# Patient Record
Sex: FEMALE | URBAN - METROPOLITAN AREA
[De-identification: names, ages, dates, MRNs, and addresses within clinical notes are randomized per-mention and may not be internally consistent; named-entity substitution may affect disease eponyms.]

---

## 2018-09-03 ENCOUNTER — EMERGENCY (EMERGENCY)
Facility: HOSPITAL | Age: 43
LOS: 1 days | Discharge: HOME | End: 2018-09-03
Attending: EMERGENCY MEDICINE | Admitting: EMERGENCY MEDICINE
Payer: SELF-PAY

## 2018-09-03 VITALS
OXYGEN SATURATION: 100 % | DIASTOLIC BLOOD PRESSURE: 99 MMHG | HEART RATE: 76 BPM | TEMPERATURE: 98 F | SYSTOLIC BLOOD PRESSURE: 138 MMHG | RESPIRATION RATE: 16 BRPM

## 2018-09-03 PROCEDURE — 99284 EMERGENCY DEPT VISIT MOD MDM: CPT

## 2018-09-03 RX ORDER — ACETAMINOPHEN 500 MG
975 TABLET ORAL ONCE
Qty: 0 | Refills: 0 | Status: COMPLETED | OUTPATIENT
Start: 2018-09-03 | End: 2018-09-03

## 2018-09-03 RX ORDER — IBUPROFEN 200 MG
600 TABLET ORAL ONCE
Qty: 0 | Refills: 0 | Status: COMPLETED | OUTPATIENT
Start: 2018-09-03 | End: 2018-09-03

## 2018-09-03 RX ADMIN — Medication 600 MILLIGRAM(S): at 23:06

## 2018-09-03 RX ADMIN — Medication 975 MILLIGRAM(S): at 23:06

## 2018-09-03 NOTE — ED PROVIDER NOTE - PHYSICAL EXAMINATION
Head: There is a mild erythema surrounding the L eye no bruising or bleeding, EOMI  Cervical spine without TTP, TTP on the lumbar spine  TTP of the R forearm, R knee, L hip, no overlying signs of trauma or deformity

## 2018-09-03 NOTE — ED ADULT NURSE NOTE - OBJECTIVE STATEMENT
rec'd pt. a&ox3, with hx of ovarian cyst, came in s/p fall tonight. as per pt, she was going down the stairs, when she slipped about 6 steps. pt. states she hit the left side of her head, then hit her back on the last step. pt. denies any LOC, felt dizzy after fall. denies any n/v.  pt. currently c/o pain on left parietal area, rt f/a, mid-lower back and rt knee worsened upon mov't. noted swelling on left side of forehead, abrasion on left side of face. also noted swelling on rt ankle. pt. denies any numbness, no urinary/bowel incontinence s/p fall. pt. denies any chest pain nor dizziness prior fall. pt. appears very uncomfortable. awaits MD nolen. will continue to monitor

## 2018-09-03 NOTE — ED PROVIDER NOTE - ATTENDING CONTRIBUTION TO CARE
Gong: I have seen and examined the patient face to face, have reviewed and addended the HPI, PE and a/p as necessary.    44 yo F with no pmh arrives after fall down 6 stairs, noting to have hit head and right knee and left hip while fallling down the stairs.  Patient report 10/10 pain in lower back and right knee.  Notable to have severe pain over forehead and around eyes with slight redness noted on face.    Gen: In severe pain in multiple locations, notable for exquisite tenderness around eyes to just light touch, without significant swelling or ecchymosis, no stepoff or deformity noted around orbit, or on the skull; chest no palpable pain, lungs clear to auscultation, chest s1 s2 rrr no murmur gallops rubs, Abd: soft ntnd no rebound no guarding; back: tender in lower back lateral to spine bilaterally; Tender at left hip; no right hip tenderness; and R knee tenderness to light touch, no obvious swelling or deformity, unable to palpate full knee 2/2 pain.  Tenderness to R arm.      44 yo F a/w fall down 6 steps, concern for possible fractures and msk injury.    - ct scan of abd pelvis with spine reconstruct of LS  - xray of knee  - xray r arm  - ct head max fac  - pain control

## 2018-09-03 NOTE — ED PROVIDER NOTE - PROGRESS NOTE DETAILS
Spoke with patient told her that xrays and CTs were negative she feels comfortable going home and using over the counter pain medications. She is going to take a cab.

## 2018-09-03 NOTE — ED PROVIDER NOTE - OBJECTIVE STATEMENT
Pt is a 43 Y F with no pmhx who says she fell while walking down the stairs today at work, she states she slipped off the last step, hitting her low back against the step and the right side of her face against the wall. She says that she didn't pass out, she denies nausea, vomiting. She does not take any medications including any blood thinners. She says she can't walk on her L leg because of pain in the knee. She says her face and head hurt. She denies any neck pain but she says her low back is very painful. She denies abdominal pain.

## 2018-09-03 NOTE — ED ADULT TRIAGE NOTE - CHIEF COMPLAINT QUOTE
pt comes to ED by EMS for mechanical fall pt tripped down 6 steps pt hit back on her last step. pt is complaining of back and leg pain. pt denies any CP or dizziness prior to falling. pt denies LOC or hitting her head. pt denies being on any blood thinners or PMHX. pt VSS

## 2018-09-03 NOTE — ED ADULT NURSE NOTE - NSIMPLEMENTINTERV_GEN_ALL_ED
Implemented All Fall Risk Interventions:  [place] to call system. Call bell, personal items and telephone within reach. Instruct patient to call for assistance. Room bathroom lighting operational. Non-slip footwear when patient is off stretcher. Physically safe environment: no spills, clutter or unnecessary equipment. Stretcher in lowest position, wheels locked, appropriate side rails in place. Provide visual cue, wrist band, yellow gown, etc. Monitor gait and stability. Monitor for mental status changes and reorient to person, place, and time. Review medications for side effects contributing to fall risk. Reinforce activity limits and safety measures with patient and family.

## 2018-09-04 VITALS
HEART RATE: 74 BPM | DIASTOLIC BLOOD PRESSURE: 75 MMHG | OXYGEN SATURATION: 100 % | TEMPERATURE: 97 F | RESPIRATION RATE: 17 BRPM | SYSTOLIC BLOOD PRESSURE: 121 MMHG

## 2018-09-04 PROCEDURE — 70486 CT MAXILLOFACIAL W/O DYE: CPT | Mod: 26

## 2018-09-04 PROCEDURE — 73562 X-RAY EXAM OF KNEE 3: CPT | Mod: 26,RT

## 2018-09-04 PROCEDURE — 73090 X-RAY EXAM OF FOREARM: CPT | Mod: 26,RT

## 2018-09-04 PROCEDURE — 70450 CT HEAD/BRAIN W/O DYE: CPT | Mod: 26

## 2018-09-04 PROCEDURE — 72131 CT LUMBAR SPINE W/O DYE: CPT | Mod: 26

## 2018-09-04 PROCEDURE — 74176 CT ABD & PELVIS W/O CONTRAST: CPT | Mod: 26

## 2018-09-04 RX ORDER — IBUPROFEN 200 MG
1 TABLET ORAL
Qty: 20 | Refills: 0 | OUTPATIENT
Start: 2018-09-04 | End: 2018-09-08

## 2018-09-04 RX ORDER — ACETAMINOPHEN 500 MG
2 TABLET ORAL
Qty: 30 | Refills: 0 | OUTPATIENT
Start: 2018-09-04 | End: 2018-09-08

## 2018-09-04 RX ADMIN — Medication 975 MILLIGRAM(S): at 00:21

## 2018-09-04 RX ADMIN — Medication 600 MILLIGRAM(S): at 00:21

## 2018-09-04 NOTE — ED ADULT NURSE REASSESSMENT NOTE - NS ED NURSE REASSESS COMMENT FT1
pt. currently a&ox3, still c/o pain, MD made aware. CT results came back. awaits dispo. will continue to monitor